# Patient Record
Sex: FEMALE | Race: WHITE | NOT HISPANIC OR LATINO | Employment: STUDENT | URBAN - METROPOLITAN AREA
[De-identification: names, ages, dates, MRNs, and addresses within clinical notes are randomized per-mention and may not be internally consistent; named-entity substitution may affect disease eponyms.]

---
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
## 2022-07-21 ENCOUNTER — OFFICE VISIT (OUTPATIENT)
Dept: OPHTHALMOLOGY | Facility: CLINIC | Age: 22
End: 2022-07-21
Payer: COMMERCIAL

## 2022-07-21 DIAGNOSIS — H52.223 REGULAR ASTIGMATISM OF BOTH EYES: ICD-10-CM

## 2022-07-21 DIAGNOSIS — H04.129 DRY EYE: Primary | ICD-10-CM

## 2022-07-21 PROCEDURE — 1159F MED LIST DOCD IN RCRD: CPT | Mod: CPTII,S$GLB,, | Performed by: OPTOMETRIST

## 2022-07-21 PROCEDURE — 1159F PR MEDICATION LIST DOCUMENTED IN MEDICAL RECORD: ICD-10-PCS | Mod: CPTII,S$GLB,, | Performed by: OPTOMETRIST

## 2022-07-21 PROCEDURE — 1160F PR REVIEW ALL MEDS BY PRESCRIBER/CLIN PHARMACIST DOCUMENTED: ICD-10-PCS | Mod: CPTII,S$GLB,, | Performed by: OPTOMETRIST

## 2022-07-21 PROCEDURE — 92014 COMPRE OPH EXAM EST PT 1/>: CPT | Mod: S$GLB,,, | Performed by: OPTOMETRIST

## 2022-07-21 PROCEDURE — 99999 PR PBB SHADOW E&M-EST. PATIENT-LVL II: ICD-10-PCS | Mod: PBBFAC,,, | Performed by: OPTOMETRIST

## 2022-07-21 PROCEDURE — 92015 PR REFRACTION: ICD-10-PCS | Mod: S$GLB,,, | Performed by: OPTOMETRIST

## 2022-07-21 PROCEDURE — 1160F RVW MEDS BY RX/DR IN RCRD: CPT | Mod: CPTII,S$GLB,, | Performed by: OPTOMETRIST

## 2022-07-21 PROCEDURE — 92015 DETERMINE REFRACTIVE STATE: CPT | Mod: S$GLB,,, | Performed by: OPTOMETRIST

## 2022-07-21 PROCEDURE — 99999 PR PBB SHADOW E&M-EST. PATIENT-LVL II: CPT | Mod: PBBFAC,,, | Performed by: OPTOMETRIST

## 2022-07-21 PROCEDURE — 92014 PR EYE EXAM, EST PATIENT,COMPREHESV: ICD-10-PCS | Mod: S$GLB,,, | Performed by: OPTOMETRIST

## 2022-07-21 NOTE — PROGRESS NOTES
HPI     Annual Exam     Comments: Vision changes since last eye exam?: depends  Any eye pain today: no  Other ocular symptoms: smudge that appears over eyes unsure of which one  Interested in contact lens fitting today? unsure                      Last edited by Rosina Sylvester MA on 7/21/2022  1:15 PM. (History)            Assessment /Plan     For exam results, see Encounter Report.    Dry eye  Recommended systane prn-bid OU    Regular astigmatism of both eyes    Eyeglass Final Rx     Eyeglass Final Rx       Sphere Cylinder Axis    Right -0.25 +0.50 120    Left -0.75 +0.75 075    Expiration Date: 7/21/2023                RTC 1 yr for undilated eye exam or PRN if any problems.   Discussed above and answered questions.

## 2022-11-30 ENCOUNTER — OFFICE VISIT (OUTPATIENT)
Dept: OBSTETRICS AND GYNECOLOGY | Facility: CLINIC | Age: 22
End: 2022-11-30
Payer: COMMERCIAL

## 2022-11-30 VITALS
WEIGHT: 136.44 LBS | HEIGHT: 61 IN | BODY MASS INDEX: 25.76 KG/M2 | DIASTOLIC BLOOD PRESSURE: 60 MMHG | SYSTOLIC BLOOD PRESSURE: 110 MMHG

## 2022-11-30 DIAGNOSIS — Z30.41 ENCOUNTER FOR SURVEILLANCE OF CONTRACEPTIVE PILLS: Primary | ICD-10-CM

## 2022-11-30 DIAGNOSIS — R07.89 LEFT-SIDED CHEST WALL PAIN: ICD-10-CM

## 2022-11-30 PROCEDURE — 3078F DIAST BP <80 MM HG: CPT | Mod: CPTII,S$GLB,, | Performed by: OBSTETRICS & GYNECOLOGY

## 2022-11-30 PROCEDURE — 3008F PR BODY MASS INDEX (BMI) DOCUMENTED: ICD-10-PCS | Mod: CPTII,S$GLB,, | Performed by: OBSTETRICS & GYNECOLOGY

## 2022-11-30 PROCEDURE — 3074F SYST BP LT 130 MM HG: CPT | Mod: CPTII,S$GLB,, | Performed by: OBSTETRICS & GYNECOLOGY

## 2022-11-30 PROCEDURE — 3008F BODY MASS INDEX DOCD: CPT | Mod: CPTII,S$GLB,, | Performed by: OBSTETRICS & GYNECOLOGY

## 2022-11-30 PROCEDURE — 99395 PR PREVENTIVE VISIT,EST,18-39: ICD-10-PCS | Mod: S$GLB,,, | Performed by: OBSTETRICS & GYNECOLOGY

## 2022-11-30 PROCEDURE — 1159F PR MEDICATION LIST DOCUMENTED IN MEDICAL RECORD: ICD-10-PCS | Mod: CPTII,S$GLB,, | Performed by: OBSTETRICS & GYNECOLOGY

## 2022-11-30 PROCEDURE — 1160F PR REVIEW ALL MEDS BY PRESCRIBER/CLIN PHARMACIST DOCUMENTED: ICD-10-PCS | Mod: CPTII,S$GLB,, | Performed by: OBSTETRICS & GYNECOLOGY

## 2022-11-30 PROCEDURE — 99999 PR PBB SHADOW E&M-EST. PATIENT-LVL III: ICD-10-PCS | Mod: PBBFAC,,, | Performed by: OBSTETRICS & GYNECOLOGY

## 2022-11-30 PROCEDURE — 1159F MED LIST DOCD IN RCRD: CPT | Mod: CPTII,S$GLB,, | Performed by: OBSTETRICS & GYNECOLOGY

## 2022-11-30 PROCEDURE — 1160F RVW MEDS BY RX/DR IN RCRD: CPT | Mod: CPTII,S$GLB,, | Performed by: OBSTETRICS & GYNECOLOGY

## 2022-11-30 PROCEDURE — 3074F PR MOST RECENT SYSTOLIC BLOOD PRESSURE < 130 MM HG: ICD-10-PCS | Mod: CPTII,S$GLB,, | Performed by: OBSTETRICS & GYNECOLOGY

## 2022-11-30 PROCEDURE — 99999 PR PBB SHADOW E&M-EST. PATIENT-LVL III: CPT | Mod: PBBFAC,,, | Performed by: OBSTETRICS & GYNECOLOGY

## 2022-11-30 PROCEDURE — 3078F PR MOST RECENT DIASTOLIC BLOOD PRESSURE < 80 MM HG: ICD-10-PCS | Mod: CPTII,S$GLB,, | Performed by: OBSTETRICS & GYNECOLOGY

## 2022-11-30 PROCEDURE — 99395 PREV VISIT EST AGE 18-39: CPT | Mod: S$GLB,,, | Performed by: OBSTETRICS & GYNECOLOGY

## 2022-11-30 RX ORDER — NORGESTIMATE AND ETHINYL ESTRADIOL 0.25-0.035
1 KIT ORAL DAILY
Qty: 28 TABLET | Refills: 11 | Status: SHIPPED | OUTPATIENT
Start: 2022-11-30 | End: 2023-10-29 | Stop reason: SDUPTHER

## 2022-11-30 NOTE — PROGRESS NOTES
"  Subjective:       Patient ID: Dasha Lucas is a 22 y.o. female.    Chief Complaint:  Gynecologic Exam      History of Present Illness  HPI  The patient presents for exam with left breast pain regular menses, no gyn issues  Contraceptive measures are addressed. On oral contraceptives   Preventive testing reviewed and discussed.     No palpable mass.  Left breast always larger than right  No nipple discharge     Patient is left lashon         Health Maintenance   Topic Date Due    Hepatitis C Screening  Never done    Lipid Panel  Never done    Chlamydia Screening  10/19/2019    TETANUS VACCINE  2023    HPV Vaccines  Completed     GYN & OB History  Patient's last menstrual period was 2022.   Date of Last Pap: No result found    OB History    Para Term  AB Living   0 0 0 0 0 0   SAB IAB Ectopic Multiple Live Births   0 0 0 0 0       Review of Systems  Review of Systems        Objective:   /60   Ht 5' 1" (1.549 m)   Wt 61.9 kg (136 lb 7.4 oz)   LMP 2022   BMI 25.78 kg/m²    Physical Exam:        Pulmonary/Chest: She exhibits tenderness. Left breast exhibits no inverted nipple, no mass, no nipple discharge, no skin change, no tenderness, no bleeding, no swelling and no mastectomy. Breasts are asymmetrical (left breast slilghtly larger).                         Tenderness is reproduced on palpation of the left chest wall and the pectoralis m.   Assessment:        1. Encounter for surveillance of contraceptive pills    2. Left-sided chest wall pain                Plan:            Dasha was seen today for gynecologic exam.    Diagnoses and all orders for this visit:    Encounter for surveillance of contraceptive pills    Left-sided chest wall pain  Comments:  Ibuprofen    Other orders  -     norgestimate-ethinyl estradioL (MONO-LINYAH) 0.25-35 mg-mcg per tablet; Take 1 tablet by mouth once daily.        "

## 2023-02-22 ENCOUNTER — PATIENT MESSAGE (OUTPATIENT)
Dept: OBSTETRICS AND GYNECOLOGY | Facility: CLINIC | Age: 23
End: 2023-02-22
Payer: COMMERCIAL

## 2023-02-22 ENCOUNTER — TELEPHONE (OUTPATIENT)
Dept: OBSTETRICS AND GYNECOLOGY | Facility: CLINIC | Age: 23
End: 2023-02-22
Payer: COMMERCIAL

## 2023-02-22 NOTE — TELEPHONE ENCOUNTER
Called patient and she was one day late starting her b/c pills and her cycle ran an extra day longer.  Patient concerned because she had some spotting after starting new pack.  Advised patient it may take a pack or two to get back on track as before.  Patient verbalized understanding.

## 2023-02-22 NOTE — TELEPHONE ENCOUNTER
----- Message from Elena Hinkle sent at 2/22/2023  1:52 PM CST -----  Pt is requesting a call back at.[phone]  Thx jm

## 2023-09-20 ENCOUNTER — PATIENT MESSAGE (OUTPATIENT)
Dept: OBSTETRICS AND GYNECOLOGY | Facility: CLINIC | Age: 23
End: 2023-09-20
Payer: COMMERCIAL

## 2023-10-30 RX ORDER — NORGESTIMATE AND ETHINYL ESTRADIOL 0.25-0.035
1 KIT ORAL DAILY
Qty: 28 TABLET | Refills: 11 | Status: SHIPPED | OUTPATIENT
Start: 2023-10-30

## 2024-01-17 ENCOUNTER — OFFICE VISIT (OUTPATIENT)
Dept: OBSTETRICS AND GYNECOLOGY | Facility: CLINIC | Age: 24
End: 2024-01-17
Payer: COMMERCIAL

## 2024-01-17 VITALS — WEIGHT: 142.88 LBS | BODY MASS INDEX: 26.98 KG/M2 | HEIGHT: 61 IN

## 2024-01-17 DIAGNOSIS — Z30.41 ENCOUNTER FOR SURVEILLANCE OF CONTRACEPTIVE PILLS: Primary | ICD-10-CM

## 2024-01-17 PROCEDURE — 99999 PR PBB SHADOW E&M-EST. PATIENT-LVL III: CPT | Mod: PBBFAC,,, | Performed by: OBSTETRICS & GYNECOLOGY

## 2024-01-17 PROCEDURE — 1159F MED LIST DOCD IN RCRD: CPT | Mod: CPTII,S$GLB,, | Performed by: OBSTETRICS & GYNECOLOGY

## 2024-01-17 PROCEDURE — 3008F BODY MASS INDEX DOCD: CPT | Mod: CPTII,S$GLB,, | Performed by: OBSTETRICS & GYNECOLOGY

## 2024-01-17 PROCEDURE — 99395 PREV VISIT EST AGE 18-39: CPT | Mod: S$GLB,,, | Performed by: OBSTETRICS & GYNECOLOGY

## 2024-01-17 NOTE — PROGRESS NOTES
"  Subjective:       Patient ID: Dasha Lucas is a 23 y.o. female.    Chief Complaint:  Well Woman      History of Present Illness  HPI  The patient presents for exam with no complaints, regular menses, no gyn issues  Contraceptive measures are addressed.   On oral contraceptives for control of heavy painful menses   Preventive testing reviewed and discussed.   Pap in one year     Health Maintenance   Topic Date Due    Hepatitis C Screening  Never done    Lipid Panel  Never done    Chlamydia Screening  10/19/2019    Pap Smear  Never done    TETANUS VACCINE  2023    HPV Vaccines  Completed     GYN & OB History  Patient's last menstrual period was 2023.   Date of Last Pap: No result found    OB History    Para Term  AB Living   0 0 0 0 0 0   SAB IAB Ectopic Multiple Live Births   0 0 0 0 0       Review of Systems  Review of Systems        Objective:   BP (P) 118/84   Ht 5' 1" (1.549 m)   Wt 64.8 kg (142 lb 13.7 oz)   LMP 2023   BMI 26.99 kg/m²    Physical Exam   Not indicated   Assessment:        1. Encounter for surveillance of contraceptive pills                Plan:            Dasha was seen today for well woman.    Diagnoses and all orders for this visit:    Encounter for surveillance of contraceptive pills      Continue oral contraceptives   RTC in one year for pap   "

## 2024-10-11 RX ORDER — NORGESTIMATE AND ETHINYL ESTRADIOL 0.25-0.035
1 KIT ORAL
Qty: 84 TABLET | Refills: 1 | Status: SHIPPED | OUTPATIENT
Start: 2024-10-11

## 2024-10-11 NOTE — TELEPHONE ENCOUNTER
Refill Authorization Note     Refill Decision Note   Dasha Lucas  is requesting a refill authorization.  Brief Assessment and Rationale for Refill:  Approve     Medication Therapy Plan:       Medication Reconciliation Completed: No   Comments:     No Care Gaps recommended.     Note composed:10:11 AM 10/11/2024

## 2024-10-18 RX ORDER — NORGESTIMATE AND ETHINYL ESTRADIOL 0.25-0.035
1 KIT ORAL DAILY
Qty: 84 TABLET | Refills: 1 | Status: SHIPPED | OUTPATIENT
Start: 2024-10-18

## 2024-11-14 ENCOUNTER — OFFICE VISIT (OUTPATIENT)
Dept: OPHTHALMOLOGY | Facility: CLINIC | Age: 24
End: 2024-11-14
Payer: COMMERCIAL

## 2024-11-14 DIAGNOSIS — H53.8 BLURRED VISION, BILATERAL: Primary | ICD-10-CM

## 2024-11-14 DIAGNOSIS — H52.223 REGULAR ASTIGMATISM OF BOTH EYES: ICD-10-CM

## 2024-11-14 PROCEDURE — 92014 COMPRE OPH EXAM EST PT 1/>: CPT | Mod: S$GLB,,, | Performed by: OPTOMETRIST

## 2024-11-14 PROCEDURE — 1159F MED LIST DOCD IN RCRD: CPT | Mod: CPTII,S$GLB,, | Performed by: OPTOMETRIST

## 2024-11-14 PROCEDURE — 1160F RVW MEDS BY RX/DR IN RCRD: CPT | Mod: CPTII,S$GLB,, | Performed by: OPTOMETRIST

## 2024-11-14 PROCEDURE — 92015 DETERMINE REFRACTIVE STATE: CPT | Mod: S$GLB,,, | Performed by: OPTOMETRIST

## 2024-11-14 PROCEDURE — 99999 PR PBB SHADOW E&M-EST. PATIENT-LVL II: CPT | Mod: PBBFAC,,, | Performed by: OPTOMETRIST

## 2024-11-14 NOTE — PROGRESS NOTES
HPI     Annual Exam            Comments: Vision changes since last eye exam?: yes     Any eye pain today: no    Other ocular symptoms: no    Interested in contact lens fitting today? Yes if needed                     Last edited by Ann Escobar on 11/14/2024  7:53 AM.            Assessment /Plan     For exam results, see Encounter Report.    Blurred vision, bilateral    Regular astigmatism of both eyes      Eyeglass Final Rx       Eyeglass Final Rx         Sphere Cylinder Axis    Right -0.25 +0.50 115    Left -0.75 +0.75 075      Expiration Date: 11/14/2025                    RTC 1 yr for undilated eye exam or PRN if any problems.   Discussed above and answered questions.

## 2024-12-03 ENCOUNTER — OFFICE VISIT (OUTPATIENT)
Dept: OBSTETRICS AND GYNECOLOGY | Facility: CLINIC | Age: 24
End: 2024-12-03
Payer: COMMERCIAL

## 2024-12-03 VITALS
WEIGHT: 146.63 LBS | SYSTOLIC BLOOD PRESSURE: 114 MMHG | HEIGHT: 61 IN | DIASTOLIC BLOOD PRESSURE: 74 MMHG | BODY MASS INDEX: 27.68 KG/M2

## 2024-12-03 DIAGNOSIS — Z30.41 ENCOUNTER FOR SURVEILLANCE OF CONTRACEPTIVE PILLS: ICD-10-CM

## 2024-12-03 DIAGNOSIS — Z12.4 PAP SMEAR FOR CERVICAL CANCER SCREENING: Primary | ICD-10-CM

## 2024-12-03 DIAGNOSIS — Z01.419 ENCOUNTER FOR GYNECOLOGICAL EXAMINATION WITHOUT ABNORMAL FINDING: ICD-10-CM

## 2024-12-03 DIAGNOSIS — Z11.3 SCREEN FOR STD (SEXUALLY TRANSMITTED DISEASE): ICD-10-CM

## 2024-12-03 PROCEDURE — 87491 CHLMYD TRACH DNA AMP PROBE: CPT | Performed by: OBSTETRICS & GYNECOLOGY

## 2024-12-03 PROCEDURE — 99999 PR PBB SHADOW E&M-EST. PATIENT-LVL III: CPT | Mod: PBBFAC,,, | Performed by: OBSTETRICS & GYNECOLOGY

## 2024-12-03 PROCEDURE — 88175 CYTOPATH C/V AUTO FLUID REDO: CPT | Performed by: OBSTETRICS & GYNECOLOGY

## 2024-12-03 RX ORDER — NORGESTIMATE AND ETHINYL ESTRADIOL 0.25-0.035
1 KIT ORAL DAILY
Qty: 84 TABLET | Refills: 4 | Status: SHIPPED | OUTPATIENT
Start: 2024-12-03

## 2024-12-03 RX ORDER — PHENTERMINE HYDROCHLORIDE 37.5 MG/1
37.5 TABLET ORAL
COMMUNITY
Start: 2024-08-12 | End: 2024-12-10

## 2024-12-03 NOTE — PROGRESS NOTES
"  Subjective:       Patient ID: Dasha Lucas is a 24 y.o. female.    Chief Complaint:  Annual Exam      History of Present Illness  HPI  The patient presents for exam with no complaints, regular menses, no gyn issues  Contraceptive measures are addressed.   On oral contraceptives with no problems, will continue   Preventive testing reviewed and discussed.    Health Maintenance   Topic Date Due    Hepatitis C Screening  Never done    Lipid Panel  Never done    HIV Screening  Never done    Chlamydia Screening  10/19/2019    Pap Smear  Never done    COVID-19 Vaccine (3 - 2024- season) 2024    TETANUS VACCINE  2033    RSV Vaccine (Age 60+ and Pregnant patients) (1 - 1-dose 75+ series) 2077    Influenza Vaccine  Completed    HPV Vaccines  Completed    Pneumococcal Vaccines (Age 0-64)  Aged Out     GYN & OB History  Patient's last menstrual period was 2024.   Date of Last Pap: 12/3/2024    OB History    Para Term  AB Living   0 0 0 0 0 0   SAB IAB Ectopic Multiple Live Births   0 0 0 0 0       Review of Systems  Review of Systems        Objective:   /74   Ht 5' 1" (1.549 m)   Wt 66.5 kg (146 lb 9.7 oz)   LMP 2024   BMI 27.70 kg/m²    Physical Exam:   Constitutional: She appears well-developed and well-nourished. No distress.      Neck: No JVD present. No thyroid mass and no thyromegaly present.    Cardiovascular:  Normal rate and regular rhythm.             Pulmonary/Chest: Right breast exhibits no inverted nipple, no mass, no nipple discharge, no skin change, no tenderness, no bleeding and no swelling. Left breast exhibits no inverted nipple, no mass, no nipple discharge, no skin change, no tenderness, no bleeding and no swelling.        Abdominal: Soft. Bowel sounds are normal. No hernia. Hernia confirmed negative in the ventral area, confirmed negative in the right inguinal area and confirmed negative in the left inguinal area.     Genitourinary:    Vagina, " uterus and rectum normal.     Labial bartholins normal.There is no rash, tenderness, lesion or injury on the right labia. There is no rash, tenderness, lesion or injury on the left labia. Cervix is normal. Right adnexum displays no mass, no tenderness and no fullness. Left adnexum displays no mass, no tenderness and no fullness. No erythema, vaginal discharge, tenderness or bleeding in the vagina.    No foreign body in the vagina.   Cervix exhibits no motion tenderness, no discharge and no friability.    pap smear completedUterus is not deviated, not enlarged, not fixed and not tender. Urethral Meatus exhibits: no urethral lesionUrethra findings: no tendernessBladder findings: no bladder tenderness                Cervical std screen done      Assessment:        1. Pap smear for cervical cancer screening    2. Screen for STD (sexually transmitted disease)    3. Encounter for gynecological examination without abnormal finding    4. Encounter for surveillance of contraceptive pills                Plan:            Dasha was seen today for annual exam.    Diagnoses and all orders for this visit:    Pap smear for cervical cancer screening  -     Liquid-Based Pap Smear, Screening    Screen for STD (sexually transmitted disease)  -     C. trachomatis/N. gonorrhoeae by AMP DNA    Encounter for gynecological examination without abnormal finding    Encounter for surveillance of contraceptive pills  -     norgestimate-ethinyl estradioL (MONO-LINYAH) 0.25-35 mg-mcg per tablet; Take 1 tablet by mouth once daily.    Follow up 18 months

## 2024-12-05 ENCOUNTER — PATIENT MESSAGE (OUTPATIENT)
Dept: OBSTETRICS AND GYNECOLOGY | Facility: CLINIC | Age: 24
End: 2024-12-05
Payer: COMMERCIAL

## 2024-12-05 LAB
C TRACH DNA SPEC QL NAA+PROBE: NOT DETECTED
N GONORRHOEA DNA SPEC QL NAA+PROBE: NOT DETECTED

## 2024-12-27 ENCOUNTER — PATIENT MESSAGE (OUTPATIENT)
Dept: OPTOMETRY | Facility: CLINIC | Age: 24
End: 2024-12-27
Payer: COMMERCIAL

## 2025-06-18 ENCOUNTER — PATIENT MESSAGE (OUTPATIENT)
Dept: RESEARCH | Facility: HOSPITAL | Age: 25
End: 2025-06-18
Payer: COMMERCIAL